# Patient Record
Sex: FEMALE | URBAN - METROPOLITAN AREA
[De-identification: names, ages, dates, MRNs, and addresses within clinical notes are randomized per-mention and may not be internally consistent; named-entity substitution may affect disease eponyms.]

---

## 2021-07-03 ENCOUNTER — APPOINTMENT (OUTPATIENT)
Dept: RADIOLOGY | Facility: MEDICAL CENTER | Age: 51
End: 2021-07-03
Attending: EMERGENCY MEDICINE

## 2021-07-03 ENCOUNTER — HOSPITAL ENCOUNTER (EMERGENCY)
Facility: MEDICAL CENTER | Age: 51
End: 2021-07-04
Attending: EMERGENCY MEDICINE | Admitting: EMERGENCY MEDICINE
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
## 2021-07-03 DIAGNOSIS — S50.312A ABRASION OF LEFT ELBOW, INITIAL ENCOUNTER: ICD-10-CM

## 2021-07-03 DIAGNOSIS — S79.912A INJURY OF LEFT HIP, INITIAL ENCOUNTER: ICD-10-CM

## 2021-07-03 DIAGNOSIS — S49.92XA INJURY OF LEFT SHOULDER, INITIAL ENCOUNTER: ICD-10-CM

## 2021-07-03 DIAGNOSIS — S59.902A INJURY OF LEFT ELBOW, INITIAL ENCOUNTER: ICD-10-CM

## 2021-07-03 PROCEDURE — 99283 EMERGENCY DEPT VISIT LOW MDM: CPT

## 2021-07-04 ENCOUNTER — APPOINTMENT (OUTPATIENT)
Dept: RADIOLOGY | Facility: MEDICAL CENTER | Age: 51
End: 2021-07-04
Attending: EMERGENCY MEDICINE

## 2021-07-04 VITALS
BODY MASS INDEX: 20.16 KG/M2 | TEMPERATURE: 98 F | HEART RATE: 58 BPM | SYSTOLIC BLOOD PRESSURE: 119 MMHG | OXYGEN SATURATION: 96 % | DIASTOLIC BLOOD PRESSURE: 68 MMHG | HEIGHT: 65 IN | WEIGHT: 121 LBS | RESPIRATION RATE: 16 BRPM

## 2021-07-04 PROCEDURE — 73080 X-RAY EXAM OF ELBOW: CPT | Mod: LT

## 2021-07-04 PROCEDURE — 73030 X-RAY EXAM OF SHOULDER: CPT | Mod: LT

## 2021-07-04 PROCEDURE — 73502 X-RAY EXAM HIP UNI 2-3 VIEWS: CPT | Mod: LT

## 2021-07-04 NOTE — ED TRIAGE NOTES
"Chief Complaint   Patient presents with   • GLF     Patient was standing and tripped over the sidewalk. Patient states she fell onto her left shoulder and hip. Denies hitting head. Denies LOC. States she took 500mg of Aspirin post fall. Patient able to move left arm with small limited ROM.     Pt amb to triage with steady gait for above complaint.     Pt is alert and oriented, speaking in full sentences, follows commands and responds appropriately to questions. NAD. Resp are even and unlabored.     Pt placed in lobby. Pt educated on triage process. Pt encouraged to alert staff for any changes.    This RN masked and in appropriate PPE during encounter. Patient also in mask.     /108   Pulse 70   Temp 36.6 °C (97.9 °F) (Temporal)   Resp 16   Ht 1.651 m (5' 5\")   Wt 54.9 kg (121 lb)   SpO2 97%   Breastfeeding No   BMI 20.14 kg/m²       "

## 2021-07-04 NOTE — ED PROVIDER NOTES
ED Provider Note    CHIEF COMPLAINT  Chief Complaint   Patient presents with   • GLF     Patient was standing and tripped over the sidewalk. Patient states she fell onto her left shoulder and hip. Denies hitting head. Denies LOC. States she took 500mg of Aspirin post fall. Patient able to move left arm with small limited ROM.        HPI    Primary care provider: No primary care provider on file.   History obtained from: Patient and daughter  History limited by: None     Ledy Richards is a 51 y.o. female who presents to the ED with daughter complaining of injury due to accidental fall shortly prior to arrival.  Patient reports that she was on roller skates and fell backwards hitting her left shoulder, elbow and left hip/lower back on the ground.  She denies head injury or loss of consciousness.  She reports that she was unable to move her left shoulder after the injury but now is able to move it but continues to have pain as well as pain in her left elbow and left lower back/hip.  She denies pain anywhere else or other injuries.  She took aspirin without significant improvement.  She is not on any blood thinners.  She denies possibility of pregnancy and is postmenopausal.  She also has an abrasion to her left elbow and reports that her last tetanus shot was approximately 3 years ago.  She denies weakness or sensory change.    REVIEW OF SYSTEMS  Please see HPI for pertinent positives/negatives.  All other systems reviewed and are negative.     PAST MEDICAL HISTORY  No past medical history on file.     SURGICAL HISTORY  History reviewed. No pertinent surgical history.     SOCIAL HISTORY  Social History     Tobacco Use   • Smoking status: Never Smoker   • Smokeless tobacco: Never Used   Vaping Use   • Vaping Use: Never used   Substance and Sexual Activity   • Alcohol use: Not Currently   • Drug use: Never   • Sexual activity: Not on file        FAMILY HISTORY  History reviewed. No pertinent family history.     CURRENT  "MEDICATIONS  Home Medications    **Home medications have not yet been reviewed for this encounter**          ALLERGIES  No Known Allergies     PHYSICAL EXAM  VITAL SIGNS: /68   Pulse (!) 58   Temp 36.7 °C (98 °F) (Temporal)   Resp 16   Ht 1.651 m (5' 5\")   Wt 54.9 kg (121 lb)   SpO2 96%   Breastfeeding No   BMI 20.14 kg/m²  @[id number]::@     Pulse ox interpretation: 97% I interpret this pulse ox as normal     Constitutional: Well developed, well nourished, alert in no apparent distress, nontoxic appearance   HENT: No external signs of trauma, normocephalic, bilateral external ears normal, no periorbital swelling/bruising, no mastoid swelling/bruising   Eyes: PERRL, conjunctiva without erythema, no discharge, no icterus   Neck: Soft and supple, trachea midline, no stridor, no swelling/bruising, no midline C-spine tenderness, no stepoffs, good ROM without discomfort or restrictions  Thorax & Lungs: No respiratory distress, no chest tenderness  Abdomen: Soft, nontender, nondistended, no G/R, pelvis stable   Back: Normal inspection, no swelling/bruising, no midline TTP, no stepoffs, no CVAT   Extremities: No cyanosis, left shoulder and left elbow with diffuse tenderness to palpation and also tenderness to palpation along the posterior left hip, no gross deformity, good ROM at all joints, intact distal pulses with brisk cap refill   Skin: Warm, dry, no pallor/cyanosis, no rash noted, small abrasion to left elbow posteriorly  Lymphatic: No lymphadenopathy noted   Neuro: A/O times 3, GCS15, no focal deficits noted, sensation intact to touch, equal strength bilateral UE/LE   Psychiatric: Cooperative, normal mood and affect, normal judgement, appropriate for clinical situation        DIAGNOSTIC STUDIES / PROCEDURES        LABS  All labs reviewed by me.     No results found for this or any previous visit.     RADIOLOGY  The radiologist's interpretation of all radiological studies have been reviewed by me. "     DX-HIP-COMPLETE - UNILATERAL 2+ LEFT   Final Result      No LEFT hip or pelvic fracture.      DX-ELBOW-COMPLETE 3+ LEFT   Final Result      No fracture or dislocation of LEFT elbow.      DX-SHOULDER 2+ LEFT   Final Result      No fracture or dislocation of LEFT shoulder.             COURSE & MEDICAL DECISION MAKING  Nursing notes, VS, PMSFHx reviewed in chart.     Review of past medical records shows the patient without recent visits to this ED.      Differential diagnoses considered include but are not limited to: Fx, dislocation, subluxation, contusion, strain, sprain, abrasion       History and physical exam as above.  X-rays without evidence of acute bony injury as above.  I discussed the findings with the patient.  This is an alert and pleasant patient in no acute distress and nontoxic in appearance with likely contusion/strain/sprain given the history/exam/findings.  She was advised on outpatient follow-up and given return to ED precautions.  Also discussed with patient supportive home care including ice and using acetaminophen/ibuprofen as needed.  Patient verbalized understanding and agreed with plan of care with no further questions or concerns.      The patient is referred to a primary physician for blood pressure management, diabetic screening, and for all other preventative health concerns.       FINAL IMPRESSION  1. Accidental fall, initial encounter Acute   2. Injury of left shoulder, initial encounter Acute   3. Injury of left elbow, initial encounter Acute   4. Abrasion of left elbow, initial encounter Acute   5. Injury of left hip, initial encounter Acute          DISPOSITION  Patient will be discharged home in stable condition.       FOLLOW UP  Please follow-up with your doctor    Call in 1 day      [facility], Emergency Dept  [address] [facility] [place] [address] [phone]    If symptoms worsen         OUTPATIENT MEDICATIONS  There are no discharge medications for  this patient.         Electronically signed by: David Landeros D.O., 7/3/2021 11:34 PM      Portions of this record were made with voice recognition software.  Despite my review, spelling/grammar/context errors may still remain.  Interpretation of this chart should be taken in this context.

## 2021-07-04 NOTE — ED NOTES
Discharge instructions given and discussed, signed copy in chart. Pt verbalized understanding and all questions answered. Pt discharged home in stable condition. Personal belongings with patient.